# Patient Record
Sex: FEMALE | Race: BLACK OR AFRICAN AMERICAN | Employment: PART TIME | URBAN - METROPOLITAN AREA
[De-identification: names, ages, dates, MRNs, and addresses within clinical notes are randomized per-mention and may not be internally consistent; named-entity substitution may affect disease eponyms.]

---

## 2021-11-27 ENCOUNTER — HOSPITAL ENCOUNTER (INPATIENT)
Age: 26
LOS: 2 days | Discharge: LEFT BEFORE DISCHARGE | DRG: 881 | End: 2021-11-29
Attending: EMERGENCY MEDICINE | Admitting: PSYCHIATRY & NEUROLOGY
Payer: COMMERCIAL

## 2021-11-27 ENCOUNTER — APPOINTMENT (OUTPATIENT)
Dept: CT IMAGING | Age: 26
DRG: 881 | End: 2021-11-27
Attending: EMERGENCY MEDICINE
Payer: COMMERCIAL

## 2021-11-27 DIAGNOSIS — F32.9 MAJOR DEPRESSIVE DISORDER, REMISSION STATUS UNSPECIFIED, UNSPECIFIED WHETHER RECURRENT: Primary | ICD-10-CM

## 2021-11-27 LAB
ALBUMIN SERPL-MCNC: 4.1 G/DL (ref 3.5–5)
ALBUMIN/GLOB SERPL: 1 {RATIO} (ref 1.1–2.2)
ALP SERPL-CCNC: 71 U/L (ref 45–117)
ALT SERPL-CCNC: 28 U/L (ref 12–78)
AMPHET UR QL SCN: NEGATIVE
ANION GAP SERPL CALC-SCNC: 6 MMOL/L (ref 5–15)
APAP SERPL-MCNC: <2 UG/ML (ref 10–30)
AST SERPL-CCNC: 24 U/L (ref 15–37)
BARBITURATES UR QL SCN: NEGATIVE
BASOPHILS # BLD: 0 K/UL (ref 0–0.1)
BASOPHILS NFR BLD: 0 % (ref 0–1)
BENZODIAZ UR QL: NEGATIVE
BILIRUB SERPL-MCNC: 0.2 MG/DL (ref 0.2–1)
BUN SERPL-MCNC: 7 MG/DL (ref 6–20)
BUN/CREAT SERPL: 10 (ref 12–20)
CALCIUM SERPL-MCNC: 8.9 MG/DL (ref 8.5–10.1)
CANNABINOIDS UR QL SCN: POSITIVE
CHLORIDE SERPL-SCNC: 110 MMOL/L (ref 97–108)
CO2 SERPL-SCNC: 24 MMOL/L (ref 21–32)
COCAINE UR QL SCN: NEGATIVE
COMMENT, HOLDF: NORMAL
CREAT SERPL-MCNC: 0.73 MG/DL (ref 0.55–1.02)
DIFFERENTIAL METHOD BLD: ABNORMAL
DRUG SCRN COMMENT,DRGCM: ABNORMAL
EOSINOPHIL # BLD: 0 K/UL (ref 0–0.4)
EOSINOPHIL NFR BLD: 0 % (ref 0–7)
ERYTHROCYTE [DISTWIDTH] IN BLOOD BY AUTOMATED COUNT: 15.9 % (ref 11.5–14.5)
ETHANOL SERPL-MCNC: 210 MG/DL
FLUAV RNA SPEC QL NAA+PROBE: NOT DETECTED
FLUBV RNA SPEC QL NAA+PROBE: NOT DETECTED
GLOBULIN SER CALC-MCNC: 4.3 G/DL (ref 2–4)
GLUCOSE SERPL-MCNC: 105 MG/DL (ref 65–100)
HCG UR QL: NEGATIVE
HCT VFR BLD AUTO: 37.9 % (ref 35–47)
HGB BLD-MCNC: 11.9 G/DL (ref 11.5–16)
IMM GRANULOCYTES # BLD AUTO: 0 K/UL (ref 0–0.04)
IMM GRANULOCYTES NFR BLD AUTO: 0 % (ref 0–0.5)
LYMPHOCYTES # BLD: 1.7 K/UL (ref 0.8–3.5)
LYMPHOCYTES NFR BLD: 25 % (ref 12–49)
MCH RBC QN AUTO: 23.5 PG (ref 26–34)
MCHC RBC AUTO-ENTMCNC: 31.4 G/DL (ref 30–36.5)
MCV RBC AUTO: 74.9 FL (ref 80–99)
METHADONE UR QL: NEGATIVE
MONOCYTES # BLD: 0.5 K/UL (ref 0–1)
MONOCYTES NFR BLD: 7 % (ref 5–13)
NEUTS SEG # BLD: 4.5 K/UL (ref 1.8–8)
NEUTS SEG NFR BLD: 68 % (ref 32–75)
NRBC # BLD: 0 K/UL (ref 0–0.01)
NRBC BLD-RTO: 0 PER 100 WBC
OPIATES UR QL: NEGATIVE
PCP UR QL: NEGATIVE
PLATELET # BLD AUTO: 307 K/UL (ref 150–400)
PMV BLD AUTO: 9 FL (ref 8.9–12.9)
POTASSIUM SERPL-SCNC: 4 MMOL/L (ref 3.5–5.1)
PROT SERPL-MCNC: 8.4 G/DL (ref 6.4–8.2)
RBC # BLD AUTO: 5.06 M/UL (ref 3.8–5.2)
SALICYLATES SERPL-MCNC: <1.7 MG/DL (ref 2.8–20)
SAMPLES BEING HELD,HOLD: NORMAL
SARS-COV-2, COV2: NOT DETECTED
SODIUM SERPL-SCNC: 140 MMOL/L (ref 136–145)
UR CULT HOLD, URHOLD: NORMAL
WBC # BLD AUTO: 6.7 K/UL (ref 3.6–11)

## 2021-11-27 PROCEDURE — 80307 DRUG TEST PRSMV CHEM ANLYZR: CPT

## 2021-11-27 PROCEDURE — 74011250637 HC RX REV CODE- 250/637: Performed by: NURSE PRACTITIONER

## 2021-11-27 PROCEDURE — 36415 COLL VENOUS BLD VENIPUNCTURE: CPT

## 2021-11-27 PROCEDURE — 99284 EMERGENCY DEPT VISIT MOD MDM: CPT

## 2021-11-27 PROCEDURE — 65220000003 HC RM SEMIPRIVATE PSYCH

## 2021-11-27 PROCEDURE — 80143 DRUG ASSAY ACETAMINOPHEN: CPT

## 2021-11-27 PROCEDURE — 82077 ASSAY SPEC XCP UR&BREATH IA: CPT

## 2021-11-27 PROCEDURE — 70450 CT HEAD/BRAIN W/O DYE: CPT

## 2021-11-27 PROCEDURE — 87636 SARSCOV2 & INF A&B AMP PRB: CPT

## 2021-11-27 PROCEDURE — 80053 COMPREHEN METABOLIC PANEL: CPT

## 2021-11-27 PROCEDURE — 81025 URINE PREGNANCY TEST: CPT

## 2021-11-27 PROCEDURE — 85025 COMPLETE CBC W/AUTO DIFF WBC: CPT

## 2021-11-27 PROCEDURE — 80179 DRUG ASSAY SALICYLATE: CPT

## 2021-11-27 PROCEDURE — 72125 CT NECK SPINE W/O DYE: CPT

## 2021-11-27 RX ORDER — HYDROXYZINE 50 MG/1
50 TABLET, FILM COATED ORAL
Status: DISCONTINUED | OUTPATIENT
Start: 2021-11-27 | End: 2021-11-29 | Stop reason: HOSPADM

## 2021-11-27 RX ORDER — OLANZAPINE 5 MG/1
5 TABLET ORAL
Status: DISCONTINUED | OUTPATIENT
Start: 2021-11-27 | End: 2021-11-29 | Stop reason: HOSPADM

## 2021-11-27 RX ORDER — ADHESIVE BANDAGE
30 BANDAGE TOPICAL DAILY PRN
Status: DISCONTINUED | OUTPATIENT
Start: 2021-11-27 | End: 2021-11-29 | Stop reason: HOSPADM

## 2021-11-27 RX ORDER — LORAZEPAM 2 MG/ML
1 INJECTION INTRAMUSCULAR
Status: DISCONTINUED | OUTPATIENT
Start: 2021-11-27 | End: 2021-11-29 | Stop reason: HOSPADM

## 2021-11-27 RX ORDER — ACETAMINOPHEN 325 MG/1
650 TABLET ORAL
Status: DISCONTINUED | OUTPATIENT
Start: 2021-11-27 | End: 2021-11-29 | Stop reason: HOSPADM

## 2021-11-27 RX ORDER — TRAZODONE HYDROCHLORIDE 50 MG/1
50 TABLET ORAL
Status: DISCONTINUED | OUTPATIENT
Start: 2021-11-27 | End: 2021-11-29 | Stop reason: HOSPADM

## 2021-11-27 RX ORDER — BENZTROPINE MESYLATE 1 MG/1
1 TABLET ORAL
Status: DISCONTINUED | OUTPATIENT
Start: 2021-11-27 | End: 2021-11-29 | Stop reason: HOSPADM

## 2021-11-27 RX ORDER — HALOPERIDOL 5 MG/ML
5 INJECTION INTRAMUSCULAR
Status: DISCONTINUED | OUTPATIENT
Start: 2021-11-27 | End: 2021-11-29 | Stop reason: HOSPADM

## 2021-11-27 RX ORDER — DIPHENHYDRAMINE HYDROCHLORIDE 50 MG/ML
50 INJECTION, SOLUTION INTRAMUSCULAR; INTRAVENOUS
Status: DISCONTINUED | OUTPATIENT
Start: 2021-11-27 | End: 2021-11-29 | Stop reason: HOSPADM

## 2021-11-27 RX ADMIN — ACETAMINOPHEN 650 MG: 325 TABLET ORAL at 17:30

## 2021-11-27 NOTE — ED NOTES
6:59 AM  Change of shift. Care of patient taken over from Dr. Mario Alberto Collins; H&P reviewed, bedside handoff complete. Awaiting crisis evaluation completion, psychiatric disposition, likely for admission.

## 2021-11-27 NOTE — PROGRESS NOTES
Problem: Alcohol Withdrawal  Goal: *STG: Verbalizes abstinence as an achievable goal  Outcome: Progressing Towards Goal     Problem: Alcohol Withdrawal  Goal: *STG: Agrees to participate in outpatient after care program to support ongoing mental health  Outcome: Progressing Towards Goal     Problem: Depressed Mood (Adult/Pediatric)  Goal: *STG: Verbalizes anger, guilt, and other feelings in a constructive manor  Outcome: Progressing Towards Goal     Problem: Depressed Mood (Adult/Pediatric)  Goal: *STG: Demonstrates reduction in symptoms and increase in insight into coping skills/future focused  Outcome: Progressing Towards Goal     1309 - New Admission - Received on the unit for admission assessment; pleasant and cooperative on approach. Verbalizes feeling hopeless in recent past and admitted to drinking heavily recently. Reports not being able to recall some of the details that lead up to hospitalization. 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 Personal belongings secured, initial assessment completed. Oriented to unit. Will monitor for safety.

## 2021-11-27 NOTE — ED TRIAGE NOTES
Pt called 911. Police state the pt called them stating that she was done. Police state the pt did not outright state she was suicidal but was alluding to it. Police state the pt was standing in the middle of [address] state they are eliciting a ECO while the pt is in triage. Pt reports she does not want to harm herself but wanted the police to harm her. Pt reports she was walking down the street just to walk.        Pt denies HI

## 2021-11-27 NOTE — PROGRESS NOTES
Problem: Alcohol Withdrawal  Goal: *STG: Remains safe in hospital  Outcome: Progressing Towards Goal    Pt signed release form for wife. Given prn tylenol for headache pain. Will reassess within 1 hr.

## 2021-11-27 NOTE — ED PROVIDER NOTES
HPI     81-year-old female who denies any past medical or psychiatric history is brought to the ED by [place] for reportedly threatening to kill herself walking into traffic and suicide by . Patient has been drinking tonight. She denies any of these allegations denies a history of depression or suicide. Denies any drugs other than alcohol. She has been vaccinated for Covid patient has an abrasion to her forehead and she cannot tell me how that happened. History reviewed. No pertinent past medical history. No past surgical history on file. History reviewed. No pertinent family history. Social History     Socioeconomic History    Marital status: SINGLE     Spouse name: Not on file    Number of children: Not on file    Years of education: Not on file    Highest education level: Not on file   Occupational History    Not on file   Tobacco Use    Smoking status: Never Smoker    Smokeless tobacco: Not on file   Substance and Sexual Activity    Alcohol use: No    Drug use: No    Sexual activity: Not on file   Other Topics Concern    Not on file   Social History Narrative    Not on file     Social Determinants of Health     Financial Resource Strain:     Difficulty of Paying Living Expenses: Not on file   Food Insecurity:     Worried About Running Out of Food in the Last Year: Not on file    Adriel of Food in the Last Year: Not on file   Transportation Needs:     Lack of Transportation (Medical): Not on file    Lack of Transportation (Non-Medical):  Not on file   Physical Activity:     Days of Exercise per Week: Not on file    Minutes of Exercise per Session: Not on file   Stress:     Feeling of Stress : Not on file   Social Connections:     Frequency of Communication with Friends and Family: Not on file    Frequency of Social Gatherings with Friends and Family: Not on file    Attends Druze Services: Not on file    Active Member of Clubs or Organizations: Not on file  Attends Club or Organization Meetings: Not on file    Marital Status: Not on file   Intimate Partner Violence:     Fear of Current or Ex-Partner: Not on file    Emotionally Abused: Not on file    Physically Abused: Not on file    Sexually Abused: Not on file   Housing Stability:     Unable to Pay for Housing in the Last Year: Not on file    Number of [place] in the Last Year: Not on file    Unstable Housing in the Last Year: Not on file         ALLERGIES: Patient has no known allergies. Review of Systems   Constitutional: Negative for fever. HENT: Negative for congestion. Eyes: Negative for visual disturbance. Respiratory: Negative for cough and shortness of breath. Cardiovascular: Negative for chest pain. Gastrointestinal: Negative for abdominal pain, nausea and vomiting. Endocrine: Negative for polyuria. Genitourinary: Negative for dysuria. Musculoskeletal: Positive for neck pain. Negative for gait problem. Skin: Negative for rash. Neurological: Negative for headaches. Psychiatric/Behavioral: Negative for dysphoric mood, hallucinations and suicidal ideas. Vitals:    11/27/21 0613 11/27/21 0630 11/27/21 0635   BP: (!) 122/94 139/85    Pulse: (!) 104 93    Resp: 16 20    Temp: 97.8 °F (36.6 °C) 98.1 °F (36.7 °C)    SpO2: 98% 98%    Weight:   103.4 kg (228 lb)   Height:   5' 6\" (1.676 m)            Physical Exam  Constitutional:       General: She is not in acute distress. Appearance: She is well-developed. HENT:      Head: Normocephalic. Comments: Abrasion to right for head and lateral zygoma     Mouth/Throat:      Pharynx: No oropharyngeal exudate. Eyes:      General: No scleral icterus. Right eye: No discharge. Left eye: No discharge. Pupils: Pupils are equal, round, and reactive to light. Neck:      Vascular: No JVD. Cardiovascular:      Rate and Rhythm: Normal rate and regular rhythm. Heart sounds: Normal heart sounds.  No murmur heard. Pulmonary:      Effort: Pulmonary effort is normal. No respiratory distress. Breath sounds: Normal breath sounds. No stridor. No wheezing or rales. Chest:      Chest wall: No tenderness. Abdominal:      General: Bowel sounds are normal. There is no distension. Palpations: Abdomen is soft. There is no mass. Tenderness: There is no abdominal tenderness. There is no guarding or rebound. Musculoskeletal:         General: Tenderness (mild cervical tenderness) present. Normal range of motion. Cervical back: Normal range of motion and neck supple. Skin:     General: Skin is warm and dry. Capillary Refill: Capillary refill takes less than 2 seconds. Findings: No rash. Neurological:      Mental Status: She is oriented to person, place, and time. Psychiatric:         Behavior: Behavior normal.         Thought Content: Thought content normal.         Judgment: Judgment normal.          MDM       Procedures      [place] police at bedside. Patient is under ECO. Will medically clear. Care signed over to Dr. Kiran Tobias at change of shift. CT's, labs pending.

## 2021-11-27 NOTE — ROUTINE PROCESS
TRANSFER - OUT REPORT:    Verbal report given to [address] RN (name) on Soraya Montoya  being transferred to [facility] psych unit rm 726 (unit) for routine progression of care       Report consisted of patients Situation, Background, Assessment and   Recommendations(SBAR). Information from the following report(s) SBAR, ED Summary and MAR was reviewed with the receiving nurse. Lines:       Opportunity for questions and clarification was provided.       Patient transported with:   Registered Nurse

## 2021-11-27 NOTE — ED NOTES
Verbal shift change report given to Nik Vogt (oncoming nurse) by Jason Rodriguez (offgoing nurse). Report included the following information SBAR, ED Summary and MAR.

## 2021-11-27 NOTE — BH NOTES
PSYCHOSOCIAL ASSESSMENT  :Patient identifying info: Juwan Huber is a 23 y.o., female admitted 11/27/2021  6:15 AM     Presenting problem and precipitating factors:  TDO issued by [facility]  Pt presented to the ED under an ECO, accompanied by [employer], after calling 911. HPD report pt called them and stated she was done, which they took to mean she was suicidal.  [facility] her standing in the [facility] - they report she told them she had a weapon in a possible attempt to die by police. During assessment, pt stated she did not want to hurt herself but would not mind if someone did it for her. Denies any drugs other than alcohol (; positive for THC). Pt denied a history of depression or suicide. Mental status assessment: Pt is pleasant and forthcoming with treatment team. Pt is AOx4. Pt is in good spirits. Pt does not remember what happened after being at the bar on Friday night with her sisters and friends but has been in contact with present individuals to piece together the story. Strengths:  Employed  Insured  Family/friend support    Collateral information:   Wife, Tameka Burk: [phone]    Current psychiatric /substance abuse providers and contact info:   No psychiatric providers  PCPQi: [phone]    Previous psychiatric/substance abuse providers and response to treatment:   None    Family history of mental illness or substance abuse: None stated    Substance abuse history:  ; positive for THC. Pt shares that she is a social drinker and drinks 2-3 beers with friends or will take several shots as she sees fit for the occasion.  Pt shares that she smokes tobacco only in social settings and smokes marijuana 2-3x/month    Social History     Tobacco Use    Smoking status: Never Smoker    Smokeless tobacco: Not on file   Substance Use Topics    Alcohol use: No       History of biomedical complications associated with substance abuse: None    Patient's current acceptance of treatment or motivation for change:  TDO    Family constellation: Wife of 2 years, has known since middle school. Wife has 3year old son who has recently been diagnosed with Autism Spectrum Disorder    Is significant other involved? Yes    Describe support system: Good, pt feels supported by wife, sisters, and friends    Describe living arrangements and home environment: Lives with wife and two year old son    Health issues:   Hospital Problems  Never Reviewed          Codes Class Noted POA    Major depression ICD-10-CM: F32.9  ICD-9-CM: 296.20  2021 Unknown              Trauma history:   None reported    Legal issues:   None reported    History of  service:   None reported    Financial status:   Between jobs at the moment    Methodist/cultural factors:   None    Education/work history:   1 yr of college - culinary arts / between jobs at the moment, 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 Pt is currently buying, re-purposing, and reselling shoes.     Have you been licensed as a health care professional (current or ):   No    Leisure and recreation preferences: None stated    Describe coping skills:  Limited    eVnu Banuelos  2021

## 2021-11-28 LAB — TSH SERPL DL<=0.05 MIU/L-ACNC: 0.93 UIU/ML (ref 0.36–3.74)

## 2021-11-28 PROCEDURE — 84443 ASSAY THYROID STIM HORMONE: CPT

## 2021-11-28 PROCEDURE — 65220000003 HC RM SEMIPRIVATE PSYCH

## 2021-11-28 NOTE — PROGRESS NOTES
Problem: Alcohol Withdrawal  Goal: *STG: Remains safe in hospital  Outcome: Progressing Towards Goal     Problem: Falls - Risk of  Goal: *Absence of Falls  Description: Document Vanessa Fall Risk and appropriate interventions in the flowsheet. Outcome: Progressing Towards Goal  Note: Fall Risk Interventions:        Medication Interventions: Assess postural VS orthostatic hypotension      Patient received resting quietly in bed. No signs of distress. Even and unlabored breathing. Staff will continue to monitor safety q15 and provide support.

## 2021-11-28 NOTE — BH NOTES
Behavioral Health Interdisciplinary Rounds     Patient Name: Judi Matt  Age: 23 y.o. Room/Bed:  734/02  Primary Diagnosis: <principal problem not specified>   Admission Status: TDO     Readmission within 30 days: no  Power of  in place: no  Patient requires a blocked bed: no          Reason for blocked bed: none    VTE Prophylaxis:     Mobility needs/Fall risk: no  Flu Vaccine :    Nutritional Plan:   Consults:         Labs/Testing due today?:     Sleep hours:       Participation in Care/Groups:   Medication Compliant?: Yes  PRNS (last 24 hours): Pain    Restraints (last 24 hours):  no     CIWA (range last 24 hours): CIWA-Ar Total: 1   COWS (range last 24 hours):      Alcohol screening (AUDIT) completed -         If applicable, date SBIRT discussed in treatment team AND documented:   AUDIT Screen Score:        Document Brief Intervention (corresponds directly with the 5 A's, Ask, Advise, Assess, Assist, and Arrange): At- Risk Patients (Score 7-15 for women; 8-15 for men)  Discuss concern patient is drinking at unhealthy levels known to increase risk of alcohol-related health problems. Is Patient ready to commit to change? If No:   Encourage reflection   Discuss short term and long term health risks of consuming alcohol   Barriers to change   Reaffirm willingness to help / Educational materials provided  If Yes:   Set goal  [facility] provided    Harmful use or Dependence (Score 16 or greater)   Discuss short term and long term health risks of consuming alcohol   Recommendations   Negotiate drinking goal   Recommend addiction specialist/center   Arrange follow-up appointments.     Tobacco - patient is a smoker: Have You Used Tobacco in the Past 30 Days: No  Illegal Drugs use: Have You Used Any Illegal Substances Over the Past 12 Months: No    24 hour chart check complete:      Patient goal(s) for today: Acclimate to unit, rest  Treatment team focus/goals: Discuss reason for admission and discuss medication management  Progress note: Pt met with treatment team for first time since admission. Pt will have TDO hearing Monday morning. Pt reports going out with friends on Friday evening, took a few shots of liquor before leaving, and had two drinks at the bar. The last thing she remembers was being at the bar and then being in the emergency room. Pt has called friends and they have told her more about what happened. Pt shared that she fell when getting into the car which explains the abrasion on her face. Pt was then taken to [facility] to spend the night. She called a friend and fell asleep on FaceTime and that was the last anybody had seen her before calling the police. Pt is pleasant and doesn't understand why she did what she did on Friday evening. Pt states this is the first time she has gotten blackout drunk and states she has no outstanding stressors that would make her feel like she would want to hurt herself. No hx of psychiatric problems or thoughts of SI or self-harm. Pt is hoping to be discharged from hearing tomorrow, states that she is feeling okay and does not require hospitalization. LOS:  1  Expected LOS: Monday    Financial concerns/prescription coverage:  yes  Family contact: Bassem Pierre, [phone]       Family requesting physician contact today:  no  Discharge plan:   Access to weapons : no        Outpatient provider(s):  MALIHA Amin  Patient's preferred phone number for follow up call : [phone]  Patient's preferred e-mail address :  Participating treatment team members: Arlet Hoang, Case Management, Zackery Lombardi RN, Constantino Qureshi NP

## 2021-11-28 NOTE — PROGRESS NOTES
Problem: Depressed Mood (Adult/Pediatric)  Goal: *STG: Participates in treatment plan  11/28/2021 1531 by Theodore Fields RN  Outcome: Progressing Towards Goal  Note: Pt out on the unit and interacting appropriately with staff and peers. Eating meals during the shift. Encouraged pt to attend groups. 11/28/2021 1248 by Theodore Fields RN  Outcome: Progressing Towards Goal  Note: Pt out on the unit and interacting with peers and staff approprietly. Eating meals during the shift.       Problem: Patient Education: Go to Patient Education Activity  Goal: Patient/Family Education  11/28/2021 1531 by Theodore Fields RN  Outcome: Progressing Towards Goal  11/28/2021 1248 by Theodore Fields RN  Outcome: Progressing Towards Goal     Problem: Depressed Mood (Adult/Pediatric)  Goal: Interventions  11/28/2021 1531 by Theodore Fields RN  Outcome: Progressing Towards Goal  11/28/2021 1248 by Theodore Fields RN  Outcome: Progressing Towards Goal

## 2021-11-28 NOTE — H&P
INITIAL PSYCHIATRIC INTERVIEW:    CHIEF COMPLAINT: \"I'm good. \"     HISTORY OF PRESENTING COMPLAINT:  Delorse Peabody is a 23 y.o. BLACK/ female who is currently admitted to the psychiatric floor at [address] after calling 911 and making suicidal statements. Per  report, she had been armed and was hoping the police would shoot her. She states she doesn't remember how she got here. The last thing she remembers was being at a bar. She doesn't remember what brought her here. She has never had anything like this happen before; she states \"I'm like the most upbeat happy person you could be\" and doesn't feel like the above described situation makes sense. She states she does have some stress currently, but nothing too severe. PAST PSYCHIATRIC HISTORY:  No hx of past psychiatric hospitalizations. She does not have an outpatient therapist or psychiatrist. She has never taken psychotropic medications. She has never attempted suicide other than the events prior to this admission. She has never had an episode of depression, never had thoughts of suicide other than above, no hx of harming others. No hx of perceptual disturbances. SUBSTANCE ABUSE HISTORY:  Esperanza Ly states she is a social drinker, and was drunk at the time of calling the police. She states this was the first time she has ever been intoxicated to the point of not remembering things. She drinks on the weekends, not daily. When she does drink on the weekend, she drinks between 2-6 drinks. She uses THC about 3-4 times a month. She doesn't use tobacco. Denies other drug use. PSYCHOSOCIAL HISTORY:   Bree Covermohit lives with her long-term girlfriend who she considers her wife though they are not legally . She is very supportive of Ivon. They also live with her partner's 3year old son, who was recently diagnosed with autism. She is not currently working, and states she is in between jobs.  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 She is looking for work. She also makes money doing re-selling. Highest level of education completed is 1 year of culinary school. She thinks she may go back to school one day. She was arrested once but the charge was dismissed, it was against a previous girlfriend. PAST MEDICAL HISTORY:  Please see H&P for details. History reviewed. No pertinent past medical history. Prior to Admission medications    Medication Sig Start Date End Date Taking? Authorizing Provider   cyclobenzaprine (FLEXERIL) 10 mg tablet Take 1 tablet by mouth three (3) times daily as needed for Muscle Spasm(s). 1/21/15   Skye DAVIDSON PA-C     Vitals:    11/27/21 0635 11/27/21 1301 11/27/21 1734 11/28/21 0913   BP:  (!) 121/93 126/77 123/89   Pulse:  92 80 62   Resp:  16 16 16   Temp:  98 °F (36.7 °C) 97.9 °F (36.6 °C) 98.2 °F (36.8 °C)   SpO2:   95% 96%   Weight: 103.4 kg (228 lb)      Height: 5' 6\" (1.676 m)        Lab Results   Component Value Date/Time    WBC 6.7 11/27/2021 06:43 AM    HGB 11.9 11/27/2021 06:43 AM    HCT 37.9 11/27/2021 06:43 AM    PLATELET 766 34/40/5339 06:43 AM    MCV 74.9 (L) 11/27/2021 06:43 AM     Lab Results   Component Value Date/Time    Sodium 140 11/27/2021 06:43 AM    Potassium 4.0 11/27/2021 06:43 AM    Chloride 110 (H) 11/27/2021 06:43 AM    CO2 24 11/27/2021 06:43 AM    Anion gap 6 11/27/2021 06:43 AM    Glucose 105 (H) 11/27/2021 06:43 AM    BUN 7 11/27/2021 06:43 AM    Creatinine 0.73 11/27/2021 06:43 AM    BUN/Creatinine ratio 10 (L) 11/27/2021 06:43 AM    GFR est AA >60 11/27/2021 06:43 AM    GFR est non-AA >60 11/27/2021 06:43 AM    Calcium 8.9 11/27/2021 06:43 AM    Bilirubin, total 0.2 11/27/2021 06:43 AM    Alk.  phosphatase 71 11/27/2021 06:43 AM    Protein, total 8.4 (H) 11/27/2021 06:43 AM    Albumin 4.1 11/27/2021 06:43 AM    Globulin 4.3 (H) 11/27/2021 06:43 AM    A-G Ratio 1.0 (L) 11/27/2021 06:43 AM    ALT (SGPT) 28 11/27/2021 06:43 AM    AST (SGOT) 24 11/27/2021 06:43 AM     No results found for: VALF2, VALAC, VALP, VALPR, DS6, CRBAM, CRBAMP, CARB2, XCRBAM  No results found for: LITHM  RADIOLOGY REPORTS:(reviewed/updated 11/28/2021)  CT HEAD WO CONT    Result Date: 11/27/2021  EXAM: CT HEAD WO CONT INDICATION: intoxicated/fell COMPARISON: None. CONTRAST: None. TECHNIQUE: Unenhanced CT of the head was performed using 5 mm images. Brain and bone windows were generated. Coronal and sagittal reformats. CT dose reduction was achieved through use of a standardized protocol tailored for this examination and automatic exposure control for dose modulation. FINDINGS: The ventricles and sulci are normal in size, shape and configuration. . There is no significant white matter disease. There is no intracranial hemorrhage, extra-axial collection, or mass effect. The basilar cisterns are open. No CT evidence of acute infarct. The bone windows demonstrate no abnormalities. The visualized portions of the paranasal sinuses and mastoid air cells are clear. No acute findings. CT SPINE CERV WO CONT    Result Date: 11/27/2021  EXAM:  CT CERVICAL SPINE WITHOUT CONTRAST INDICATION: intoxicated, fell, neck pain. COMPARISON: None. CONTRAST:  None. TECHNIQUE: Multislice helical CT of the cervical spine was performed without intravenous contrast administration. Sagittal and coronal reformats were generated. CT dose reduction was achieved through use of a standardized protocol tailored for this examination and automatic exposure control for dose modulation. FINDINGS: The alignment is within normal limits. There is no fracture or compression deformity. The odontoid process is intact. The craniocervical junction is within normal limits. The incidentally imaged soft tissues are within normal limits. There is no spinal canal or neural foraminal stenosis. The surrounding soft tissues and visualized lung apices are unremarkable. No acute findings.     Lab Results   Component Value Date/Time    Pregnancy test,urine (POC) Negative 11/27/2021 09:48 AM       MENTAL STATUS EXAM:  General appearance: well groomed, psychomotor activity is WNL  Eye contact: appropriate  Speech: WNL  Affect : reactive  Mood: \"OK\"  Thought Process: Logical, goal directed  Perception: Denies AH or VH. Thought Content: denies SI/plan/intent, denies HI/plan/intent  Insight: Partial  Judgment: Fair  Cognition: Intact grossly. ASSESSMENT AND PLAN:  Boris Kearney meets criteria for a diagnosis of unspecified depressive disorder and  Alcohol use disorder. Continue inpatient stay for the safety and optimum care of the patient   Routine labs to be ordered as needed. Psychotropic medications will be ordered and adjusted as needed. Patients status is TDO  Supportive, milieu and group therapy. Continue rest of the medications as needed. Strengths include ability to seek help   Estimated length of stay is 5-7 days.

## 2021-11-28 NOTE — BH NOTES
TRANSFER - IN REPORT:    Verbal report received from [employer], RN(name) on Shira Acuna  being received from [address] [place] [address](unit) for routine progression of care      Report consisted of patients Situation, Background, Assessment and   Recommendations(SBAR). Information from the following report(s) SBAR was reviewed with the receiving nurse. Opportunity for questions and clarification was provided. Assessment completed upon patients arrival to unit and care assumed.

## 2021-11-28 NOTE — PROGRESS NOTES
[facility] 60  Master Treatment Plan for Delorse Peabody    Date Treatment Plan Initiated: 11/28/21    Treatment Plan Modalities:  Type of Modality Amount  (x minutes) Frequency (x/week) Duration (x days) Name of Responsible Staff   Community & wrap-up meetings to encourage peer interactions 15 7 1      Group psychotherapy to assist in building coping skills and internal controls 60 7 1 Roxana Bautista   Therapeutic activity groups to build coping skills 60 7 1 Roxana Bautista   Psychoeducation in group setting to address:   Medication education   15 7 [address] PharmD   Coping skills   30 3 1 Roxana Bautista   Relaxation techniques         Symptom management         Discharge planning   60 2 1 Roxana Bautista   Spirituality    60 2 1 Chaplain DESAI   61 1 1 Volunteer of [facility]/AA/NA         Physician medication management   13 7 1 Dr. Leopoldo [address] meeting/discharge planning   15 2 1 Purnima Blake and Roxana Bautista                                   Problem: Depressed Mood (Adult/Pediatric)  Goal: *STG: Participates in treatment plan  Outcome: Progressing Towards Goal  Note: Pt out on the unit and interacting with peers and staff approprietly. Eating meals during the shift. Goal: *STG: Verbalizes anger, guilt, and other feelings in a constructive manor  Outcome: Progressing Towards Goal  Note: Able to verbalize feelings in a constructive manor. Mood is bright. Goal: *STG: Attends activities and groups  Outcome: Progressing Towards Goal  Note: Encouraged pt to attend groups. Goal: *STG: Remains safe in hospital  Outcome: Progressing Towards Goal  Note: Remains safe on the unit and continued on Q 15 minute safety checks.   Goal: Interventions  Outcome: Progressing Towards Goal     Problem: Patient Education: Go to Patient Education Activity  Goal: Patient/Family Education  Outcome: Progressing Towards Goal     Problem: Falls - Risk of  Goal: *Absence of Falls  Description: Document Vanessa Fall Risk and appropriate interventions in the flowsheet.   11/28/2021 1248 by Annalee Reyes RN  Outcome: Progressing Towards Goal  Note: Fall Risk Interventions:            Medication Interventions: Assess postural VS orthostatic hypotension         History of Falls Interventions: Door open when patient unattended      11/28/2021 1245 by Annalee Reyes RN  Note: Fall Risk Interventions:            Medication Interventions: Assess postural VS orthostatic hypotension         History of Falls Interventions: Door open when patient unattended         Problem: Patient Education: Go to Patient Education Activity  Goal: Patient/Family Education  Outcome: Progressing Towards Goal

## 2021-11-29 VITALS
TEMPERATURE: 97.8 F | HEIGHT: 66 IN | DIASTOLIC BLOOD PRESSURE: 84 MMHG | HEART RATE: 68 BPM | SYSTOLIC BLOOD PRESSURE: 123 MMHG | RESPIRATION RATE: 12 BRPM | OXYGEN SATURATION: 98 % | WEIGHT: 228 LBS | BODY MASS INDEX: 36.64 KG/M2

## 2021-11-29 NOTE — PROGRESS NOTES
Problem: Falls - Risk of  Goal: *Absence of Falls  Description: Document Jordan [place] Fall Risk and appropriate interventions in the flowsheet. Outcome: Progressing Towards Goal  Note: Fall Risk Interventions:     Medication Interventions: Teach patient to arise slowly    History of Falls Interventions: Door open when patient unattended      Patient received resting quietly in bed. No signs of distress. Even and unlabored breathing. Staff will continue to monitor safety Q15 and provide support.

## 2021-11-29 NOTE — DISCHARGE SUMMARY
PSYCHIATRIC DISCHARGE SUMMARY      Patient: Katharina Silver MRN: [id number]  SSN: [id number]    YOB: 1995  Age: 23 y.o. Sex: female        Date of Admission: 11/27/2021  Date of Discharge:11/29/2021       Type of Discharge:  RELEASED BY THE TDO COURT    Admission data:  CHIEF COMPLAINT: \"I'm good. \"      HISTORY OF PRESENTING COMPLAINT:  Katharina Silver is a 23 y.o. BLACK/ female who is currently admitted to the psychiatric floor at [facility] after calling 911 and making suicidal statements. Per  report, she had been armed and was hoping the police would shoot her. She states she doesn't remember how she got here. The last thing she remembers was being at a bar. She doesn't remember what brought her here. She has never had anything like this happen before; she states \"I'm like the most upbeat happy person you could be\" and doesn't feel like the above described situation makes sense. She states she does have some stress currently, but nothing too severe.      PAST PSYCHIATRIC HISTORY:  No hx of past psychiatric hospitalizations. She does not have an outpatient therapist or psychiatrist. She has never taken psychotropic medications. She has never attempted suicide other than the events prior to this admission. She has never had an episode of depression, never had thoughts of suicide other than above, no hx of harming others. No hx of perceptual disturbances.      SUBSTANCE ABUSE HISTORY:  Anshul Bhatti states she is a social drinker, and was drunk at the time of calling the police. She states this was the first time she has ever been intoxicated to the point of not remembering things. She drinks on the weekends, not daily. When she does drink on the weekend, she drinks between 2-6 drinks. She uses THC about 3-4 times a month.  She doesn't use tobacco. Denies other drug use.      PSYCHOSOCIAL HISTORY:   Pallavi Hussein lives with her long-term girlfriend who she considers her wife though they are not legally . She is very supportive of Ivon. They also live with her partner's 3year old son, who was recently diagnosed with autism. She is not currently working, and states she is in between jobs. 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 She is looking for work. She also makes money doing re-selling. Highest level of education completed is 1 year of culinary school. She thinks she may go back to school one day. She was arrested once but the charge was dismissed, it was against a previous girlfriend.      PAST MEDICAL HISTORY:  Please see H&P for details.      History reviewed. No pertinent past medical history. Prior to Admission medications    Medication Sig Start Date End Date Taking?  Authorizing Provider   cyclobenzaprine (FLEXERIL) 10 mg tablet Take 1 tablet by mouth three (3) times daily as needed for Muscle Spasm(s). 1/21/15     [place], [place]             Vitals:     11/27/21 0635 11/27/21 1301 11/27/21 1734 11/28/21 0913   BP:   (!) 121/93 126/77 123/89   Pulse:   92 80 62   Resp:   16 16 16   Temp:   98 °F (36.7 °C) 97.9 °F (36.6 °C) 98.2 °F (36.8 °C)   SpO2:     95% 96%   Weight: 103.4 kg (228 lb)         Height: 5' 6\" (1.676 m)                  Lab Results   Component Value Date/Time     WBC 6.7 11/27/2021 06:43 AM     HGB 11.9 11/27/2021 06:43 AM     HCT 37.9 11/27/2021 06:43 AM     PLATELET 292 90/38/8484 06:43 AM     MCV 74.9 (L) 11/27/2021 06:43 AM            Lab Results   Component Value Date/Time     Sodium 140 11/27/2021 06:43 AM     Potassium 4.0 11/27/2021 06:43 AM     Chloride 110 (H) 11/27/2021 06:43 AM     CO2 24 11/27/2021 06:43 AM     Anion gap 6 11/27/2021 06:43 AM     Glucose 105 (H) 11/27/2021 06:43 AM     BUN 7 11/27/2021 06:43 AM     Creatinine 0.73 11/27/2021 06:43 AM     BUN/Creatinine ratio 10 (L) 11/27/2021 06:43 AM     GFR est AA >60 11/27/2021 06:43 AM     GFR est non-AA >60 11/27/2021 06:43 AM     Calcium 8.9 11/27/2021 06:43 AM     Bilirubin, total 0.2 11/27/2021 06:43 AM     Alk. phosphatase 71 11/27/2021 06:43 AM     Protein, total 8.4 (H) 11/27/2021 06:43 AM     Albumin 4.1 11/27/2021 06:43 AM     Globulin 4.3 (H) 11/27/2021 06:43 AM     A-G Ratio 1.0 (L) 11/27/2021 06:43 AM     ALT (SGPT) 28 11/27/2021 06:43 AM     AST (SGOT) 24 11/27/2021 06:43 AM      No results found for: VALF2, VALAC, VALP, VALPR, DS6, CRBAM, CRBAMP, CARB2, XCRBAM  No results found for: LITHM  RADIOLOGY REPORTS:(reviewed/updated 11/28/2021)  CT HEAD WO CONT     Result Date: 11/27/2021  EXAM: CT HEAD WO CONT INDICATION: intoxicated/fell COMPARISON: None. CONTRAST: None. TECHNIQUE: Unenhanced CT of the head was performed using 5 mm images. Brain and bone windows were generated. Coronal and sagittal reformats. CT dose reduction was achieved through use of a standardized protocol tailored for this examination and automatic exposure control for dose modulation. FINDINGS: The ventricles and sulci are normal in size, shape and configuration. . There is no significant white matter disease. There is no intracranial hemorrhage, extra-axial collection, or mass effect. The basilar cisterns are open. No CT evidence of acute infarct. The bone windows demonstrate no abnormalities. The visualized portions of the paranasal sinuses and mastoid air cells are clear.      No acute findings.     CT SPINE CERV WO CONT     Result Date: 11/27/2021  EXAM:  CT CERVICAL SPINE WITHOUT CONTRAST INDICATION: intoxicated, fell, neck pain. COMPARISON: None. CONTRAST:  None. TECHNIQUE: Multislice helical CT of the cervical spine was performed without intravenous contrast administration. Sagittal and coronal reformats were generated. CT dose reduction was achieved through use of a standardized protocol tailored for this examination and automatic exposure control for dose modulation. FINDINGS: The alignment is within normal limits.  There is no fracture or compression deformity. The odontoid process is intact. The craniocervical junction is within normal limits. The incidentally imaged soft tissues are within normal limits. There is no spinal canal or neural foraminal stenosis. The surrounding soft tissues and visualized lung apices are unremarkable.      No acute findings.           Lab Results   Component Value Date/Time     Pregnancy test,urine (POC) Negative 11/27/2021 09:48 AM         MENTAL STATUS EXAM:  General appearance: well groomed, psychomotor activity is WNL  Eye contact: appropriate  Speech: WNL  Affect : reactive  Mood: \"OK\"  Thought Process: Logical, goal directed  Perception: Denies AH or VH. Thought Content: denies SI/plan/intent, denies HI/plan/intent  Insight: Partial  Judgment: Fair  Cognition: Intact grossly.      ASSESSMENT AND PLAN:  Yong De Leon meets criteria for a diagnosis of  Alcohol use disorder. Continue inpatient stay for the safety and optimum care of the patient   Routine labs to be ordered as needed. Psychotropic medications will be ordered and adjusted as needed. Patients status is TDO  Supportive, milieu and group therapy. Continue rest of the medications as needed. Strengths include ability to seek help   Estimated length of stay is 5-7 days. Hospital Course:    Patient was admitted to the Psychiatric services for acute psychiatric stabilization in regards to symptomatology as described in the HPI above and placed on Q15 minute checks and suicide and withdrawal precautions. Standing medications were ordered. She was placed on detox per protocol. The patient had her hearing today and was dismissed. Denied si/hi/avh. The patient was discharged against medical advice. She was informed regarding the risks of leaving AMA. She was discharged to self care. She will be followed by her outpatient provider. At discharge, she was not given any prescriptions.     Some parts of the discharge summary are from the initial Psychiatric interview that was done on admission by the admitting psychiatrist.       Allergies:(reviewed/updated 11/29/2021)  No Known Allergies    Side Effects: (reviewed/updated 11/29/2021)  None reported or admitted to. Vital Signs:  Patient Vitals for the past 24 hrs:   Temp Pulse Resp BP SpO2   11/29/21 0743 97.8 °F (36.6 °C) 68 12 123/84 98 %   11/28/21 1952 97.7 °F (36.5 °C) 65 16 109/73 97 %   11/28/21 1735 97.8 °F (36.6 °C) 86 18 119/81 97 %     Wt Readings from Last 3 Encounters:   11/27/21 103.4 kg (228 lb)   01/21/15 99.8 kg (220 lb) (99 %, Z= 2.20)*     * Growth percentiles are based on [facility] (Girls, 2-20 Years) data. Temp Readings from Last 3 Encounters:   11/29/21 97.8 °F (36.6 °C)   01/21/15 98.5 °F (36.9 °C)     BP Readings from Last 3 Encounters:   11/29/21 123/84   01/21/15 149/67     Pulse Readings from Last 3 Encounters:   11/29/21 68   01/21/15 75       Labs: (reviewed/updated 11/29/2021)  No results found for this or any previous visit (from the past 24 hour(s)). No results found for: VALF2, VALAC, VALP, VALPR, DS6, CRBAM, CRBAMP, CARB2, XCRBAM  No results found for: [facility]    Radiology (reviewed/updated 11/29/2021)  CT HEAD WO CONT    Result Date: 11/27/2021  EXAM: CT HEAD WO CONT INDICATION: intoxicated/fell COMPARISON: None. CONTRAST: None. TECHNIQUE: Unenhanced CT of the head was performed using 5 mm images. Brain and bone windows were generated. Coronal and sagittal reformats. CT dose reduction was achieved through use of a standardized protocol tailored for this examination and automatic exposure control for dose modulation. FINDINGS: The ventricles and sulci are normal in size, shape and configuration. . There is no significant white matter disease. There is no intracranial hemorrhage, extra-axial collection, or mass effect. The basilar cisterns are open. No CT evidence of acute infarct. The bone windows demonstrate no abnormalities.  The visualized portions of the paranasal sinuses and mastoid air cells are clear. No acute findings. CT SPINE CERV WO CONT    Result Date: 11/27/2021  EXAM:  CT CERVICAL SPINE WITHOUT CONTRAST INDICATION: intoxicated, fell, neck pain. COMPARISON: None. CONTRAST:  None. TECHNIQUE: Multislice helical CT of the cervical spine was performed without intravenous contrast administration. Sagittal and coronal reformats were generated. CT dose reduction was achieved through use of a standardized protocol tailored for this examination and automatic exposure control for dose modulation. FINDINGS: The alignment is within normal limits. There is no fracture or compression deformity. The odontoid process is intact. The craniocervical junction is within normal limits. The incidentally imaged soft tissues are within normal limits. There is no spinal canal or neural foraminal stenosis. The surrounding soft tissues and visualized lung apices are unremarkable. No acute findings. Mental Status Exam on Discharge:  General appearance:   Philis Phalen is a 23 y.o. BLACK/ female who is well groomed, psychomotor activity is WNL  Eye contact: makes good eye contact  Speech: Spontaneous and coherent  Affect : Euthymic  Mood: \"OK\"  Thought Process: Logical, goal directed  Perception: Denies any AH or VH. Thought Content: Denies any SI or Plan  Insight: Partial  Judgement: Fair  Cognition: Intact grossly. Discharge Diagnoses:  Unspecified depressive disorder; Unspecified etoh use disorder      There are no discharge medications for this patient. Follow-up Information     Follow up With Specialties Details Why Contact Info    Suraj Gregg MD Pediatric Medicine   [address] [place] [address],[address] [facility] [id number] [phone] [address] [place], Shelia Self, [address] Nurse Practitioner   [address] [place] [address]  [phone]          WOUND CARE: none needed.       Prognosis:   Good / Fair based on nature of patient's pathology/ies and treatment compliance issues. Prognosis is greatly dependent upon patient's ability to  follow up on psychiatric/psychotherapy appointments as well as to comply with psychiatric medications as prescribed. I certify that this patients inpatient psychiatric hospital services furnished since the previous certification were, and continue to be, required for treatment that could reasonably be expected to improve the patient's condition, or for diagnostic study, and that the patient continues to need, on a daily basis, active treatment furnished directly by or requiring the supervision of inpatient psychiatric facility personnel. In addition, the hospital records show that services furnished were intensive treatment services, admission or related services, or equivalent services.      Signed:  Donavon Carlisle NP  11/29/2021

## 2021-11-29 NOTE — BH NOTES
Behavioral Health Transition Record to Provider  [facility]  Patient Name: Tracey Watt  YOB: 1995  Medical Record Number: [id number]  Date of Admission: 11/27/2021  Date of Discharge: 11/29/2021    Attending Provider: No att. providers found  Discharging Provider: Ludy Schneider NP   To contact this individual call [phone] and ask the  to page. If unavailable, ask to be transferred to [facility] Provider on call. [facility] Provider will be available on call 24/7 and during holidays. Primary Care Provider: Marly Araujo MD    No Known Allergies    Reason for Admission: Tracey Watt is a 23 y.o. BLACK/ female who is currently admitted to the psychiatric floor at [facility] after calling 911 and making suicidal statements. Per  report, she had been armed and was hoping the police would shoot her. She states she doesn't remember how she got here. The last thing she remembers was being at a bar. She doesn't remember what brought her here. She has never had anything like this happen before; she states \"I'm like the most upbeat happy person you could be\" and doesn't feel like the above described situation makes sense. She states she does have some stress currently, but nothing too severe. Admission Diagnosis: Major depression [F32.9]    * No surgery found *    Results for orders placed or performed during the hospital encounter of 11/27/21   COVID-19 WITH INFLUENZA A/B   Result Value Ref Range    SARS-CoV-2 Not detected NOTD      Influenza A by PCR Not detected NOTD      Influenza B by PCR Not detected NOTD     URINE CULTURE HOLD SAMPLE    Specimen: Serum   Result Value Ref Range    Urine culture hold        Urine on hold in Microbiology dept for 2 days. If unpreserved urine is submitted, it cannot be used for addtional testing after 24 hours, recollection will be required.    CBC WITH AUTOMATED DIFF   Result Value Ref Range    WBC 6.7 3.6 - 11.0 K/uL    RBC 5.06 3.80 - 5.20 M/uL    HGB 11.9 11.5 - 16.0 g/dL    HCT 37.9 35.0 - 47.0 %    MCV 74.9 (L) 80.0 - 99.0 FL    MCH 23.5 (L) 26.0 - 34.0 PG    MCHC 31.4 30.0 - 36.5 g/dL    RDW 15.9 (H) 11.5 - 14.5 %    PLATELET 642 699 - 156 K/uL    MPV 9.0 8.9 - 12.9 FL    NRBC 0.0 0  WBC    ABSOLUTE NRBC 0.00 0.00 - 0.01 K/uL    NEUTROPHILS 68 32 - 75 %    LYMPHOCYTES 25 12 - 49 %    MONOCYTES 7 5 - 13 %    EOSINOPHILS 0 0 - 7 %    BASOPHILS 0 0 - 1 %    IMMATURE GRANULOCYTES 0 0.0 - 0.5 %    ABS. NEUTROPHILS 4.5 1.8 - 8.0 K/UL    ABS. LYMPHOCYTES 1.7 0.8 - 3.5 K/UL    ABS. MONOCYTES 0.5 0.0 - 1.0 K/UL    ABS. EOSINOPHILS 0.0 0.0 - 0.4 K/UL    ABS. BASOPHILS 0.0 0.0 - 0.1 K/UL    ABS. IMM. GRANS. 0.0 0.00 - 0.04 K/UL    DF AUTOMATED     METABOLIC PANEL, COMPREHENSIVE   Result Value Ref Range    Sodium 140 136 - 145 mmol/L    Potassium 4.0 3.5 - 5.1 mmol/L    Chloride 110 (H) 97 - 108 mmol/L    CO2 24 21 - 32 mmol/L    Anion gap 6 5 - 15 mmol/L    Glucose 105 (H) 65 - 100 mg/dL    BUN 7 6 - 20 MG/DL    Creatinine 0.73 0.55 - 1.02 MG/DL    BUN/Creatinine ratio 10 (L) 12 - 20      GFR est AA >60 >60 ml/min/1.73m2    GFR est non-AA >60 >60 ml/min/1.73m2    Calcium 8.9 8.5 - 10.1 MG/DL    Bilirubin, total 0.2 0.2 - 1.0 MG/DL    ALT (SGPT) 28 12 - 78 U/L    AST (SGOT) 24 15 - 37 U/L    Alk.  phosphatase 71 45 - 117 U/L    Protein, total 8.4 (H) 6.4 - 8.2 g/dL    Albumin 4.1 3.5 - 5.0 g/dL    Globulin 4.3 (H) 2.0 - 4.0 g/dL    A-G Ratio 1.0 (L) 1.1 - 2.2     ACETAMINOPHEN   Result Value Ref Range    Acetaminophen level <2 (L) 10 - 30 ug/mL   DRUG SCREEN, URINE   Result Value Ref Range    AMPHETAMINES Negative NEG      BARBITURATES Negative NEG      BENZODIAZEPINES Negative NEG      COCAINE Negative NEG      METHADONE Negative NEG      OPIATES Negative NEG      PCP(PHENCYCLIDINE) Negative NEG      THC (TH-CANNABINOL) Positive (A) NEG      Drug screen comment (NOTE)    SAMPLES BEING HELD   Result Value Ref Range    SAMPLES BEING HELD 1blu     COMMENT        Add-on orders for these samples will be processed based on acceptable specimen integrity and analyte stability, which may vary by analyte. SALICYLATE   Result Value Ref Range    Salicylate level <0.2 (L) 2.8 - 20.0 MG/DL   ETHYL ALCOHOL   Result Value Ref Range    ALCOHOL(ETHYL),SERUM 210 (H) <10 MG/DL   TSH 3RD GENERATION   Result Value Ref Range    TSH 0.93 0.36 - 3.74 uIU/mL   HCG URINE, QL. - POC   Result Value Ref Range    Pregnancy test,urine (POC) Negative NEG         Immunizations administered during this encounter: There is no immunization history on file for this patient. Screening for Metabolic Disorders for Patients on Antipsychotic Medications  (Data obtained from the EMR)    Estimated Body Mass Index  Estimated body mass index is 36.8 kg/m² as calculated from the following:    Height as of this encounter: 5' 6\" (1.676 m). Weight as of this encounter: 103.4 kg (228 lb). Vital Signs/Blood Pressure  Visit Vitals  /84 (BP Patient Position: Sitting)   Pulse 68   Temp 97.8 °F (36.6 °C)   Resp 12   Ht 5' 6\" (1.676 m)   Wt 103.4 kg (228 lb)   SpO2 98%   BMI 36.80 kg/m²       Blood Glucose/Hemoglobin A1c  Lab Results   Component Value Date/Time    Glucose 105 (H) 11/27/2021 06:43 AM       No results found for: HBA1C, OZD4EASL, CZE3LIRW     Lipid Panel  No results found for: CHOL, CHOLX, CHLST, CHOLV, 379748, HDL, HDLP, LDL, LDLC, DLDLP, TGLX, TRIGL, TRIGP, CHHD, CHHDX     Discharge Diagnosis: Alcohol use disorder. Discharge Plan: The patient Chilo Eduardo exhibits the ability to control behavior in a less restrictive environment. Patient's level of functioning is improving. No assaultive/destructive behavior has been observed for the past 24 hours. No suicidal/homicidal threat or behavior has been observed for the past 24 hours. There is no evidence of serious medication side effects.   Patient has not been in physical or protective restraints for at least the past 24 hours. If weapons involved, how are they secured? No weapon found on person    Is patient aware of and in agreement with discharge plan? [place] DISCHARGE    Arrangements for medication:  AMA DISCHARGE    Copy of discharge instructions to provider?: CSB    Arrangements for transportation home:  Family    Keep all follow up appointments as scheduled, continue to take prescribed medications per physician instructions. Mental health crisis number:  [address]  [place] Crisis: [phone]      [address] Emergency WARM LINE      [phone] ([phone])      M-F: 9am to 9pm      Sat & Sun: 5pm  9pm  National suicide prevention lines:                             4-230-HECCOVZ ([phone])       4-143-961-TALK ([phone])   24/7 Crisis Text Line:  Text HOME to [phone]    Discharge Medication List and Instructions: There are no discharge medications for this patient. Unresulted Labs (24h ago, onward)            None        To obtain results of studies pending at discharge, please contact [phone]    Follow-up Information     Follow up With Specialties Details Why Contact Info    Roseanna Siegel MD Pediatric Medicine   [address] [facility] [facility] [address] [phone] [address], [address] Nurse Practitioner   [address] [place] [address]  [phone]            Advanced Directive:   Does the patient have an appointed surrogate decision maker? No  Does the patient have a Medical Advance Directive? No  Does the patient have a Psychiatric Advance Directive? No  If the patient does not have a surrogate or Medical Advance Directive AND Psychiatric Advance Directive, the patient was offered information on these advance directives Patient declined to complete    Patient Instructions: Please continue all medications until otherwise directed by physician.       Tobacco Cessation Discharge Plan:   Is the patient a smoker and needs referral for smoking cessation? No  Patient referred to the following for smoking cessation with an appointment? No     Patient was offered medication to assist with smoking cessation at discharge? No  Was education for smoking cessation added to the discharge instructions? Yes    Alcohol/Substance Abuse Discharge Plan:   Does the patient have a history of substance/alcohol abuse and requires a referral for treatment? Yes  Patient referred to the following for substance/alcohol abuse treatment with an appointment? Refused  Patient was offered medication to assist with alcohol cessation at discharge? No  Was education for substance/alcohol abuse added to discharge instructions? Yes    Patient discharged to Home; discussed with patient/caregiver and provided to the patient/caregiver either in hard copy or electronically.

## 2021-11-29 NOTE — INTERDISCIPLINARY ROUNDS
Behavioral Health Interdisciplinary Rounds     Patient Name: Tracey Watt  Age: 23 y.o. Room/Bed:  72/  Primary Diagnosis: <principal problem not specified>   Admission Status: TDO     Readmission within 30 days: no  Power of  in place: no  Patient requires a blocked bed: no          Reason for blocked bed:     VTE Prophylaxis: Not indicated    Mobility needs/Fall risk: no  Flu Vaccine : no   Nutritional Plan: no  Consults:          Labs/Testing due today?: no    Sleep hours:  7      Participation in Care/Groups:    Medication Compliant?: none ordered  PRNS (last 24 hours): None    Restraints (last 24 hours):  no     CIWA (range last 24 hours): CIWA-Ar Total: 1   COWS (range last 24 hours):      Alcohol screening (AUDIT) completed -         If applicable, date SBIRT discussed in treatment team AND documented:   AUDIT Screen Score:        Document Brief Intervention (corresponds directly with the 5 A's, Ask, Advise, Assess, Assist, and Arrange): At- Risk Patients (Score 7-15 for women; 8-15 for men)  Discuss concern patient is drinking at unhealthy levels known to increase risk of alcohol-related health problems. Is Patient ready to commit to change? If No:   Encourage reflection   Discuss short term and long term health risks of consuming alcohol   Barriers to change   Reaffirm willingness to help / Educational materials provided  If Yes:   Set goal  Buddy provided    Harmful use or Dependence (Score 16 or greater)   Discuss short term and long term health risks of consuming alcohol   Recommendations   Negotiate drinking goal   Recommend addiction specialist/center   Arrange follow-up appointments.     Tobacco - patient is a smoker: Have You Used Tobacco in the Past 30 Days: No  Illegal Drugs use: Have You Used Any Illegal Substances Over the Past 12 Months: No    24 hour chart check complete: yes     Patient goal(s) for today:   Treatment team focus/goals:   Progress note     LOS:  2  Expected LOS:     Financial concerns/prescription coverage:    Family contact:       Family requesting physician contact today:    Discharge plan:   Access to weapons :         Outpatient provider(s):   Patient's preferred phone number for follow up call :   Patient's preferred e-mail address :  Participating treatment team members: Ramya Reyes, * (assigned SW),

## 2021-11-29 NOTE — PROGRESS NOTES
Problem: Depressed Mood (Adult/Pediatric)  Goal: *STG: Participates in treatment plan  Outcome: Progressing Towards Goal  Note: Out on unit engaged and social w peers/staff. TDO hearing this am. Released from hearing. Denies SI, states she is happy to d/c to her friends.  Staff ofcus is on offering support  Goal: *STG: Verbalizes anger, guilt, and other feelings in a constructive manor  Outcome: Progressing Towards Goal  Goal: *STG: Attends activities and groups  Outcome: Progressing Towards Goal  Goal: *STG: Remains safe in hospital  Outcome: Progressing Towards Goal  Goal: Interventions  Outcome: Progressing Towards Goal

## 2021-11-29 NOTE — DISCHARGE INSTRUCTIONS
DISCHARGE SUMMARY    Laury Wyatt  : 1995  MRN: [id number]    The patient Katharina Silver exhibits the ability to control behavior in a less restrictive environment. Patient's level of functioning is improving. No assaultive/destructive behavior has been observed for the past 24 hours. No suicidal/homicidal threat or behavior has been observed for the past 24 hours. There is no evidence of serious medication side effects. Patient has not been in physical or protective restraints for at least the past 24 hours. If weapons involved, how are they secured? No weapon found on person    Is patient aware of and in agreement with discharge plan? [place] DISCHARGE    Arrangements for medication:  AMA DISCHARGE    Copy of discharge instructions to provider?: CSB    Arrangements for transportation home:  Family    Keep all follow up appointments as scheduled, continue to take prescribed medications per physician instructions. Mental health crisis number:  [address] Crisis: [phone]      [address] Emergency WARM LINE      [phone] ([phone])      M-F: 9am to 9pm      Sat & Sun: 5pm - 9pm  National suicide prevention lines:                             4-857-SHKKKMC ([phone])       6-520-341-TALK ([phone])    Crisis Text Line:  Text HOME to [address] from Nurse    PATIENT INSTRUCTIONS:      What to do at Home:  Recommended activity: Activity as tolerated,         *  Please give a list of your current medications to your Primary Care Provider. *  Please update this list whenever your medications are discontinued, doses are      changed, or new medications (including over-the-counter products) are added. *  Please carry medication information at all times in case of emergency situations.     These are general instructions for a healthy lifestyle:    No smoking/ No tobacco products/ Avoid exposure to second hand smoke  Surgeon General's Warning: Quitting smoking now greatly reduces serious risk to your health. Obesity, smoking, and sedentary lifestyle greatly increases your risk for illness    A healthy diet, regular physical exercise & weight monitoring are important for maintaining a healthy lifestyle    You may be retaining fluid if you have a history of heart failure or if you experience any of the following symptoms:  Weight gain of 3 pounds or more overnight or 5 pounds in a week, increased swelling in our hands or feet or shortness of breath while lying flat in bed. Please call your doctor as soon as you notice any of these symptoms; do not wait until your next office visit. The discharge information has been reviewed with the patient. The patient verbalized understanding. Discharge medications reviewed with the patient and appropriate educational materials and side effects teaching were provided.   ___________________________________________________________________________________________________________________________________

## 2022-03-18 PROBLEM — F32.9 MAJOR DEPRESSION: Status: ACTIVE | Noted: 2021-11-27

## 2023-01-04 ENCOUNTER — HOSPITAL ENCOUNTER (EMERGENCY)
Age: 28
Discharge: HOME | End: 2023-01-04
Attending: EMERGENCY MEDICINE
Payer: MEDICAID

## 2023-01-04 VITALS
TEMPERATURE: 99.9 F | BODY MASS INDEX: 39.4 KG/M2 | OXYGEN SATURATION: 100 % | HEART RATE: 92 BPM | SYSTOLIC BLOOD PRESSURE: 143 MMHG | DIASTOLIC BLOOD PRESSURE: 87 MMHG | RESPIRATION RATE: 16 BRPM | WEIGHT: 245.15 LBS | HEIGHT: 66 IN

## 2023-01-04 DIAGNOSIS — U07.1 COVID-19: Primary | ICD-10-CM

## 2023-01-04 LAB
APPEARANCE UR: CLEAR
BACTERIA URNS QL MICRO: ABNORMAL /HPF
BILIRUB UR QL: NEGATIVE
COLOR UR: ABNORMAL
COVID-19 RAPID TEST, COVR: DETECTED
EPITH CASTS URNS QL MICRO: ABNORMAL /LPF
FLUAV AG NPH QL IA: NEGATIVE
FLUBV AG NOSE QL IA: NEGATIVE
GLUCOSE UR STRIP.AUTO-MCNC: NEGATIVE MG/DL
HCG UR QL: NEGATIVE
HGB UR QL STRIP: NEGATIVE
HYALINE CASTS URNS QL MICRO: ABNORMAL /LPF (ref 0–2)
KETONES UR QL STRIP.AUTO: ABNORMAL MG/DL
LEUKOCYTE ESTERASE UR QL STRIP.AUTO: ABNORMAL
NITRITE UR QL STRIP.AUTO: NEGATIVE
PH UR STRIP: 8.5 [PH] (ref 5–8)
PROT UR STRIP-MCNC: NEGATIVE MG/DL
RBC #/AREA URNS HPF: ABNORMAL /HPF (ref 0–5)
SOURCE, COVRS: ABNORMAL
SP GR UR REFRACTOMETRY: 1.02
UA: UC IF INDICATED,UAUC: ABNORMAL
UROBILINOGEN UR QL STRIP.AUTO: 1 EU/DL (ref 0.2–1)
WBC URNS QL MICRO: ABNORMAL /HPF (ref 0–4)

## 2023-01-04 PROCEDURE — 81025 URINE PREGNANCY TEST: CPT

## 2023-01-04 PROCEDURE — 87804 INFLUENZA ASSAY W/OPTIC: CPT

## 2023-01-04 PROCEDURE — 81001 URINALYSIS AUTO W/SCOPE: CPT

## 2023-01-04 PROCEDURE — 87635 SARS-COV-2 COVID-19 AMP PRB: CPT

## 2023-01-04 PROCEDURE — 99283 EMERGENCY DEPT VISIT LOW MDM: CPT

## 2023-01-04 RX ORDER — ACETAMINOPHEN 325 MG/1
650 TABLET ORAL
Qty: 20 TABLET | Refills: 0 | Status: SHIPPED | OUTPATIENT
Start: 2023-01-04

## 2023-01-04 RX ORDER — ONDANSETRON 4 MG/1
4 TABLET, ORALLY DISINTEGRATING ORAL
Qty: 20 TABLET | Refills: 0 | Status: SHIPPED | OUTPATIENT
Start: 2023-01-04

## 2023-01-04 NOTE — LETTER
[address]  [facility] EMERGENCY DEPT  [address] [address]  [phone]    Work/School Note    Date: 1/4/2023    To Whom It May concern:    Amanda Alfredo was seen and treated today in the emergency room by the following provider(s):  Attending Provider: Emmett Neff MD  Physician Assistant: KAILEE Taylor. Amanda Alfredo tested positive for COVID-19 in this facility today. She will be required to quarantine for at least 5 days and so long as her symptoms are improving, may return to routine activities so long as wearing a well fitting mask (N95) for the next 5 days. So long as she is symptomatic, she should continue to quarantine.     Sincerely,          KAILEE Alves

## 2023-01-04 NOTE — ED PROVIDER NOTES
hospitals EMERGENCY DEPT  EMERGENCY DEPARTMENT ENCOUNTER       Pt Name: Karsten Gonzalez  MRN: [id number]  [place] 1995  Date of evaluation: 1/4/2023  Provider: KAILEE Tilley   PCP: Zoraida Calvo MD  Note Started: 3:59 PM 1/4/23     CHIEF COMPLAINT       Chief Complaint   Patient presents with    Generalized Body Aches     Since waking up this morning with 2 episodes of vomiting, chills, sinus congestion, headache; LMP 12/23; not vaccinated for flu, vaccinated for COVID        HISTORY OF PRESENT ILLNESS: 1 or more elements      History From: Patient  HPI Limitations : None     Karsten Gonzalez is a 33 y.o. female who presents ambulatory with her wife with 1 day of 10 constant, achy sinus congestion, headache and chills. There has been no significant cough. She tells me this morning she woke up and did not feel well and had 2 episodes of nonbilious, nonbloody emesis. There has been no diarrhea. He denies any abdominal pain. She tells me she is vaccinated against COVID-19. She tells me she felt okay when she went to bed last night but endorses being a little stressed out from her work at [employer]. She tells me this morning when she woke up she felt unwell. She has taken nothing for her symptoms. There has been no significant cough. There has been no chest pain or shortness of breath. There are no known sick contacts and there has been no recent travel. She did express some concern about possible pregnancy and a urinary tract infection. She denies any dysuria, urgency or frequency. Her wife is here and her wife tells me she herself feels fine. Nursing Notes were all reviewed and agreed with or any disagreements were addressed in the HPI. REVIEW OF SYSTEMS      Review of Systems   Constitutional:  Positive for chills. Gastrointestinal:  Positive for nausea and vomiting. Musculoskeletal:  Positive for myalgias. Positives and Pertinent negatives as per HPI.     PAST HISTORY Past Medical History:  No past medical history on file. Past Surgical History:  No past surgical history on file. Family History:  No family history on file. Social History:  Social History     Tobacco Use    Smoking status: Never   Substance Use Topics    Alcohol use: No    Drug use: No       Allergies:  No Known Allergies    CURRENT MEDICATIONS      Discharge Medication List as of 1/4/2023  4:34 PM          SCREENINGS               No data recorded         PHYSICAL EXAM      ED Triage Vitals [01/04/23 1508]   ED Encounter Vitals Group      BP (!) 143/87      Pulse (Heart Rate) 92      Resp Rate 16      Temp 99.9 °F (37.7 °C)      Temp src       O2 Sat (%) 100 %      Weight 245 lb 2.4 oz      Height 5' 6\"        Physical Exam  Vitals and nursing note reviewed. Constitutional:       General: She is not in acute distress. Appearance: She is well-developed. She is obese. She is not toxic-appearing. HENT:      Head: Normocephalic and atraumatic. Right Ear: External ear normal.      Left Ear: External ear normal.      Nose: Nose normal.      Mouth/Throat:      Lips: No lesions. Eyes:      General: No scleral icterus. Conjunctiva/sclera: Conjunctivae normal.      Pupils: Pupils are equal, round, and reactive to light. Neck:      Comments: Neck is supple  Cardiovascular:      Rate and Rhythm: Normal rate. Comments: Heart rate is in the low 90s on my exam with a regular rhythm    Pulmonary:      Effort: Pulmonary effort is normal. No tachypnea or respiratory distress. Comments: Breathing is unlabored and lungs are clear to auscultation throughout  Abdominal:      General: There is no distension. Comments: Abdominal is soft and nontender   Musculoskeletal:         General: Normal range of motion. Cervical back: Normal range of motion. Skin:     Findings: No rash. Neurological:      Mental Status: She is alert and oriented to person, place, and time.  She is not disoriented. Cranial Nerves: No cranial nerve deficit. Psychiatric:         Speech: Speech normal.        DIAGNOSTIC RESULTS   LABS:     Recent Results (from the past 12 hour(s))   INFLUENZA A+B VIRAL AGS    Collection Time: 01/04/23  3:13 PM   Result Value Ref Range    Influenza A Antigen Negative NEG      Influenza B Antigen Negative NEG     COVID-19 RAPID TEST    Collection Time: 01/04/23  3:13 PM   Result Value Ref Range    Specimen source Nasopharyngeal      COVID-19 rapid test Detected (AA) NOTD     URINALYSIS W/ REFLEX CULTURE    Collection Time: 01/04/23  3:23 PM    Specimen: Urine   Result Value Ref Range    Color YELLOW/STRAW      Appearance CLEAR CLEAR      Specific gravity 1.022      pH (UA) 8.5 (H) 5.0 - 8.0      Protein Negative NEG mg/dL    Glucose Negative NEG mg/dL    Ketone TRACE (A) NEG mg/dL    Bilirubin Negative NEG      Blood Negative NEG      Urobilinogen 1.0 0.2 - 1.0 EU/dL    Nitrites Negative NEG      Leukocyte Esterase SMALL (A) NEG      UA:UC IF INDICATED CULTURE NOT INDICATED BY UA RESULT      WBC 5-10 0 - 4 /hpf    RBC 0-5 0 - 5 /hpf    Epithelial cells MANY (A) FEW /lpf    Bacteria 1+ (A) NEG /hpf    Hyaline cast 0-2 0 - 2 /lpf   HCG URINE, QL. - POC    Collection Time: 01/04/23  3:24 PM   Result Value Ref Range    Pregnancy test,urine (POC) Negative NEG          EKG: When ordered, EKG's are interpreted by the Emergency Department Physician in the absence of a cardiologist.  Please see their note for interpretation of EKG. Interpretation per the Radiologist below, if available at the time of this note:     No results found.       PROCEDURES   Unless otherwise noted below, none  Procedures    CRITICAL CARE TIME   None    EMERGENCY DEPARTMENT COURSE and DIFFERENTIAL DIAGNOSIS/MDM   Vitals:    Vitals:    01/04/23 1508   BP: (!) 143/87   Pulse: 92   Resp: 16   Temp: 99.9 °F (37.7 °C)   SpO2: 100%   Weight: 111.2 kg (245 lb 2.4 oz)   Height: 5' 6\" (1.676 m)        Patient was given the following medications:  Medications - No data to display    CONSULTS: (Who and What was discussed)  None    Chronic Conditions: Obesity    Social Determinants affecting Dx or Tx:  The patient does have Medicaid however she has no Primary Care for follow up    Records Reviewed (source and summary of external records): Nursing Notes and Old Medical Records    CC/HPI Summary, DDx, ED Course, and Reassessment:   DDx: COVID, influenza, UTI, pregnancy, viral syndrome    The patient presents with less than 1 day of congestion, HA, chills and malaise. She tells me she did have some nausea and vomiting this morning, but those symptoms resolved. She has taken nothing for her symptoms. Testing for flu is negative. She is positive for COVID-19. Her urinalysis is contaminated without significant pyuria or bacteriuria and does not suggest UTI. Her pregnancy test is negative. She does not complain of shortness of breath or chest pain. There has been no cough. Her breathing is unlabored and her lungs are clear. Oxygen saturation approaches 100% on room air. Given these reassuring findings, presentation is not consistent with bacterial pneumonia and imaging of the chest is deferred. Additional testing deferred. Believe reasonable to offer Zofran for nausea and vomiting. Will offer acetaminophen for fever and body aches. A note for work explaining her positive COVID-19 test is provided. I will refer her to a [facility] care at [facility]. Return precautions for worsening symptoms, specifically shortness of breath, vomiting and unable to tolerate liquids or any concerns.        Disposition Considerations (Tests not done, Shared Decision Making, Pt Expectation of Test or Tx.):      FINAL IMPRESSION     1. COVID-19          DISPOSITION/PLAN   Discharged         PATIENT REFERRED TO:  Follow-up Information       Follow up With Specialties Details Why Contact Payton Rush  Call  As needed [address] [place] [address]  [phone]              DISCHARGE MEDICATIONS:  Discharge Medication List as of 1/4/2023  4:34 PM            DISCONTINUED MEDICATIONS:  Discharge Medication List as of 1/4/2023  4:34 PM          Shared Not Shared DANYEL: I have seen and evaluated the patient. My supervision physician was available for consultation. I am the Primary Clinician of Record. KAILEE Estrada (electronically signed)    (Please note that parts of this dictation were completed with voice recognition software. Quite often unanticipated grammatical, syntax, homophones, and other interpretive errors are inadvertently transcribed by the computer software. Please disregards these errors.  Please excuse any errors that have escaped final proofreading.)

## 2023-05-19 RX ORDER — ONDANSETRON 4 MG/1
4 TABLET, ORALLY DISINTEGRATING ORAL EVERY 8 HOURS PRN
COMMUNITY
Start: 2023-01-04

## 2023-05-19 RX ORDER — ACETAMINOPHEN 325 MG/1
650 TABLET ORAL EVERY 6 HOURS PRN
COMMUNITY
Start: 2023-01-04